# Patient Record
Sex: MALE | Race: ASIAN | Employment: FULL TIME | URBAN - METROPOLITAN AREA
[De-identification: names, ages, dates, MRNs, and addresses within clinical notes are randomized per-mention and may not be internally consistent; named-entity substitution may affect disease eponyms.]

---

## 2017-04-11 ENCOUNTER — APPOINTMENT (OUTPATIENT)
Dept: GENERAL RADIOLOGY | Facility: CLINIC | Age: 47
End: 2017-04-11
Attending: EMERGENCY MEDICINE
Payer: COMMERCIAL

## 2017-04-11 ENCOUNTER — HOSPITAL ENCOUNTER (EMERGENCY)
Facility: CLINIC | Age: 47
Discharge: HOME | End: 2017-04-11
Attending: EMERGENCY MEDICINE | Admitting: EMERGENCY MEDICINE
Payer: COMMERCIAL

## 2017-04-11 VITALS
TEMPERATURE: 98.6 F | HEART RATE: 84 BPM | HEIGHT: 67 IN | OXYGEN SATURATION: 96 % | DIASTOLIC BLOOD PRESSURE: 87 MMHG | WEIGHT: 175 LBS | BODY MASS INDEX: 27.47 KG/M2 | RESPIRATION RATE: 20 BRPM | SYSTOLIC BLOOD PRESSURE: 111 MMHG

## 2017-04-11 DIAGNOSIS — R00.2 PALPITATIONS: ICD-10-CM

## 2017-04-11 DIAGNOSIS — R06.02 SHORTNESS OF BREATH: ICD-10-CM

## 2017-04-11 DIAGNOSIS — R07.9 CHEST PAIN, UNSPECIFIED TYPE: ICD-10-CM

## 2017-04-11 LAB
ANION GAP SERPL CALCULATED.3IONS-SCNC: 10 MMOL/L (ref 3–14)
BASOPHILS # BLD AUTO: 0.1 10E9/L (ref 0–0.2)
BASOPHILS NFR BLD AUTO: 0.5 %
BUN SERPL-MCNC: 12 MG/DL (ref 7–30)
CALCIUM SERPL-MCNC: 8.7 MG/DL (ref 8.5–10.1)
CHLORIDE SERPL-SCNC: 107 MMOL/L (ref 94–109)
CO2 SERPL-SCNC: 24 MMOL/L (ref 20–32)
CREAT SERPL-MCNC: 0.8 MG/DL (ref 0.66–1.25)
D DIMER PPP FEU-MCNC: NORMAL UG/ML FEU (ref 0–0.5)
DIFFERENTIAL METHOD BLD: ABNORMAL
EOSINOPHIL # BLD AUTO: 0.1 10E9/L (ref 0–0.7)
EOSINOPHIL NFR BLD AUTO: 1.3 %
ERYTHROCYTE [DISTWIDTH] IN BLOOD BY AUTOMATED COUNT: 14.6 % (ref 10–15)
GFR SERPL CREATININE-BSD FRML MDRD: NORMAL ML/MIN/1.7M2
GLUCOSE SERPL-MCNC: 96 MG/DL (ref 70–99)
HCT VFR BLD AUTO: 42.5 % (ref 40–53)
HGB BLD-MCNC: 14.4 G/DL (ref 13.3–17.7)
IMM GRANULOCYTES # BLD: 0 10E9/L (ref 0–0.4)
IMM GRANULOCYTES NFR BLD: 0.2 %
INTERPRETATION ECG - MUSE: NORMAL
LYMPHOCYTES # BLD AUTO: 2.7 10E9/L (ref 0.8–5.3)
LYMPHOCYTES NFR BLD AUTO: 24.8 %
MCH RBC QN AUTO: 24.4 PG (ref 26.5–33)
MCHC RBC AUTO-ENTMCNC: 33.9 G/DL (ref 31.5–36.5)
MCV RBC AUTO: 72 FL (ref 78–100)
MONOCYTES # BLD AUTO: 0.9 10E9/L (ref 0–1.3)
MONOCYTES NFR BLD AUTO: 8.6 %
NEUTROPHILS # BLD AUTO: 7 10E9/L (ref 1.6–8.3)
NEUTROPHILS NFR BLD AUTO: 64.6 %
NRBC # BLD AUTO: 0 10*3/UL
NRBC BLD AUTO-RTO: 0 /100
NT-PROBNP SERPL-MCNC: 49 PG/ML (ref 0–450)
PLATELET # BLD AUTO: 277 10E9/L (ref 150–450)
POTASSIUM SERPL-SCNC: 3.9 MMOL/L (ref 3.4–5.3)
RBC # BLD AUTO: 5.89 10E12/L (ref 4.4–5.9)
SODIUM SERPL-SCNC: 141 MMOL/L (ref 133–144)
TROPONIN I SERPL-MCNC: NORMAL UG/L (ref 0–0.04)
WBC # BLD AUTO: 10.8 10E9/L (ref 4–11)

## 2017-04-11 PROCEDURE — 84484 ASSAY OF TROPONIN QUANT: CPT | Performed by: EMERGENCY MEDICINE

## 2017-04-11 PROCEDURE — 85379 FIBRIN DEGRADATION QUANT: CPT | Performed by: EMERGENCY MEDICINE

## 2017-04-11 PROCEDURE — 93005 ELECTROCARDIOGRAM TRACING: CPT

## 2017-04-11 PROCEDURE — 85025 COMPLETE CBC W/AUTO DIFF WBC: CPT | Performed by: EMERGENCY MEDICINE

## 2017-04-11 PROCEDURE — 99285 EMERGENCY DEPT VISIT HI MDM: CPT | Mod: 25

## 2017-04-11 PROCEDURE — 83880 ASSAY OF NATRIURETIC PEPTIDE: CPT | Performed by: EMERGENCY MEDICINE

## 2017-04-11 PROCEDURE — 71020 XR CHEST 2 VW: CPT

## 2017-04-11 PROCEDURE — 80048 BASIC METABOLIC PNL TOTAL CA: CPT | Performed by: EMERGENCY MEDICINE

## 2017-04-11 ASSESSMENT — ENCOUNTER SYMPTOMS
HEADACHES: 0
PALPITATIONS: 0
SHORTNESS OF BREATH: 1
FEVER: 0

## 2017-04-11 NOTE — ED AVS SNAPSHOT
Emergency Department    [address] [facility] [address]    Phone:  [phone]    Fax:  [phone]                                       Aman Broussard   MRN: [id number]    Department:   Emergency Department   Date of Visit:  4/11/2017           Patient Information     Date Of Birth          1970        Your diagnoses for this visit were:     Shortness of breath     Chest pain, unspecified type     Palpitations        You were seen by Zay Graham MD.      Follow-up Information     Follow up with Clinic, [facility] In 2 days.    Contact information:    [address]  [place] [address]  [phone]          Follow up with  Emergency Department.    Specialty:  EMERGENCY MEDICINE    Why:  If symptoms worsen    Contact information:    [address] [facility] [id number] [phone]        Discharge Instructions       Discharge Instructions  Chest Pain    You have been seen today for chest pain or discomfort.  At this time, your doctor has found no signs that your chest pain is due to a serious or life-threatening condition, (or you have declined more testing and/or admission to the hospital). However, sometimes there is a serious problem that does not show up right away. Your evaluation today may not be complete and you may need further testing and evaluation.     You need to follow-up with your regular doctor within 3 days.    Return to the Emergency Department if:    Your chest pain changes, gets worse, starts to happen more often, or comes with less activity.    You are short of breath.    You get very weak or tired.    You pass out or faint.    You have any new symptoms, like fever, cough, numb legs, or you cough up blood.    You have anything else that worries you.    Until you follow-up with your regular doctor please do the following:    Take one aspirin daily unless you have an allergy or are told not to by your doctor.    If a stress  test appointment has been made, go to the appointment.    If you have questions, contact your regular doctor.    If your doctor today has told you to follow-up with your regular doctor, it is very important that you make an appointment with your clinic and go to the appointment.  If you do not follow-up with your primary doctor, it may result in missing an important development which could result in permanent injury or disability and/or lasting pain.  If there is any problem keeping your appointment, call your doctor or return to the Emergency Department.    If you were given a prescription for medicine here today, be sure to read all of the information (including the package insert) that comes with your prescription.  This will include important information about the medicine, its side effects, and any warnings that you need to know about.  The pharmacist who fills the prescription can provide more information and answer questions you may have about the medicine.  If you have questions or concerns that the pharmacist cannot address, please call or return to the Emergency Department.     Opioid Medication Information    Pain medications are among the most commonly prescribed medicines, so we are including this information for all our patients. If you did not receive pain medication or get a prescription for pain medicine, you can ignore it.     You may have been given a prescription for an opioid (narcotic) pain medicine and/or have received a pain medicine while here in the Emergency Department. These medicines can make you drowsy or impaired. You must not drive, operate dangerous equipment, or engage in any other dangerous activities while taking these medications. If you drive while taking these medications, you could be arrested for DUI, or driving under the influence. Do not drink any alcohol while you are taking these medications.     Opioid pain medications can cause addiction. If you have a history of  chemical dependency of any type, you are at a higher risk of becoming addicted to pain medications.  Only take these prescribed medications to treat your pain when all other options have been tried. Take it for as short a time and as few doses as possible. Store your pain pills in a secure place, as they are frequently stolen and provide a dangerous opportunity for children or visitors in your house to start abusing these powerful medications. We will not replace any lost or stolen medicine.  As soon as your pain is better, you should flush all your remaining medication.     Many prescription pain medications contain Tylenol  (acetaminophen), including Vicodin , Tylenol #3 , Norco , Lortab , and Percocet .  You should not take any extra pills of Tylenol  if you are using these prescription medications or you can get very sick.  Do not ever take more than 3000 mg of acetaminophen in any 24 hour period.    All opioids tend to cause constipation. Drink plenty of water and eat foods that have a lot of fiber, such as fruits, vegetables, prune juice, apple juice and high fiber cereal.  Take a laxative if you don t move your bowels at least every other day. Miralax , Milk of Magnesia, Colace , or Senna  can be used to keep you regular.      Remember that you can always come back to the Emergency Department if you are not able to see your regular doctor in the amount of time listed above, if you get any new symptoms, or if there is anything that worries you.          Discharge References/Attachments     PALPITATIONS (ENGLISH)    SHORTNESS OF BREATH (DYSPNEA) (ENGLISH)      24 Hour Appointment Hotline       To make an appointment at any [facility], call [phone] ([phone]). If you don't have a family doctor or clinic, we will help you find one. [facility] are conveniently located to serve the needs of you and your family.             Review of your medicines      Our records show that you are taking  the medicines listed below. If these are incorrect, please call your family doctor or clinic.        Dose / Directions Last dose taken    cetirizine 10 MG tablet   Commonly known as:  zyrTEC   Dose:  10 mg   Quantity:  30 tablet        Take 1 tablet (10 mg) by mouth every evening   Refills:  1        hydrocortisone 0.2 % cream   Commonly known as:  WESTCORT   Quantity:  15 g        Apply sparingly to affected area three times daily for 14 days.   Refills:  0        ketoconazole 2 % cream   Commonly known as:  NIZORAL   Quantity:  15 g        Apply topically 2 times daily   Refills:  1        METOPROLOL TARTRATE PO        Refills:  0        * psyllium 100 % Powd   Quantity:  100 g        1 tablespoon daily to bid as needed to keep stools soft.  Please give whatever standard tub is (100 grams was a guess)   Refills:  prn        * WAL-MUCIL 58.6 % Powd   Quantity:  662 g   Generic drug:  psyllium        TAKE 1 TABLESPOON DAILY TO TWICE DAILY AS NEEDED TO KEEP STOOLS SOFT   Refills:  11        * Notice:  This list has 2 medication(s) that are the same as other medications prescribed for you. Read the directions carefully, and ask your doctor or other care provider to review them with you.            Procedures and tests performed during your visit     Basic metabolic panel    CBC with platelets + differential    D dimer quantitative    EKG 12-lead, tracing only    Nt probnp inpatient    Troponin I (now)    XR Chest 2 Views      Orders Needing Specimen Collection     None      Pending Results     No orders found from 4/9/2017 to 4/12/2017.            Pending Culture Results     No orders found from 4/9/2017 to 4/12/2017.            Test Results From Your Hospital Stay        4/11/2017  2:14 PM      Component Results     Component Value Ref Range & Units Status    WBC 10.8 4.0 - 11.0 10e9/L Final    RBC Count 5.89 4.4 - 5.9 10e12/L Final    Hemoglobin 14.4 13.3 - 17.7 g/dL Final    Hematocrit 42.5 40.0 - 53.0 % Final     MCV 72 (L) 78 - 100 fl Final    MCH 24.4 (L) 26.5 - 33.0 pg Final    MCHC 33.9 31.5 - 36.5 g/dL Final    RDW 14.6 10.0 - 15.0 % Final    Platelet Count 277 150 - 450 10e9/L Final    Diff Method Automated Method  Final    % Neutrophils 64.6 % Final    % Lymphocytes 24.8 % Final    % Monocytes 8.6 % Final    % Eosinophils 1.3 % Final    % Basophils 0.5 % Final    % Immature Granulocytes 0.2 % Final    Nucleated RBCs 0 0 /100 Final    Absolute Neutrophil 7.0 1.6 - 8.3 10e9/L Final    Absolute Lymphocytes 2.7 0.8 - 5.3 10e9/L Final    Absolute Monocytes 0.9 0.0 - 1.3 10e9/L Final    Absolute Eosinophils 0.1 0.0 - 0.7 10e9/L Final    Absolute Basophils 0.1 0.0 - 0.2 10e9/L Final    Abs Immature Granulocytes 0.0 0 - 0.4 10e9/L Final    Absolute Nucleated RBC 0.0  Final         4/11/2017  2:31 PM      Component Results     Component Value Ref Range & Units Status    Sodium 141 133 - 144 mmol/L Final    Potassium 3.9 3.4 - 5.3 mmol/L Final    Chloride 107 94 - 109 mmol/L Final    Carbon Dioxide 24 20 - 32 mmol/L Final    Anion Gap 10 3 - 14 mmol/L Final    Glucose 96 70 - 99 mg/dL Final    Urea Nitrogen 12 7 - 30 mg/dL Final    Creatinine 0.80 0.66 - 1.25 mg/dL Final    GFR Estimate >90  Non  GFR Calc   >60 mL/min/1.7m2 Final    GFR Estimate If Black >90   GFR Calc   >60 mL/min/1.7m2 Final    Calcium 8.7 8.5 - 10.1 mg/dL Final         4/11/2017  2:37 PM      Component Results     Component Value Ref Range & Units Status    Troponin I ES  0.000 - 0.045 ug/L Final    <0.015  The 99th percentile for upper reference range is 0.045 ug/L.  Troponin values in   the range of 0.045 - 0.120 ug/L may be associated with risks of adverse   clinical events.           4/11/2017  2:46 PM      Narrative     CHEST TWO VIEWS April 11, 2017 2:30 PM     HISTORY: Short of breath.    COMPARISON: 01/04/2013.    FINDINGS: New electronic pack over the anterior left chest wall not  present on 1/4/2013. Heart and lungs  negative.        Impression     IMPRESSION: Negative.    CAMILA NINO MD         4/11/2017  2:41 PM      Component Results     Component Value Ref Range & Units Status    D Dimer  0.0 - 0.50 ug/ml FEU Final    <0.3  This D-dimer assay is intended for use in conjuntion with a clinical pretest   probability assessment model to exclude pulmonary embolism (PE) and as an aid   in the diagnosis of deep venous thrombosis (DVT) in outpatients suspected of PE   or DVT. The cut-off value is 0.5 g/mL FEU.           4/11/2017  2:37 PM      Component Results     Component Value Ref Range & Units Status    N-Terminal Pro BNP Inpatient 49 0 - 450 pg/mL Final    Reference range shown and results flagged as abnormal are suggested inpatient   cut points for confirming diagnosis if CHF in an acute setting. Establishing   a   baseline value for each individual patient is useful for follow-up. An   inpatient or emergency department NT-proPBNP <300 pg/mL effectively rules out   acute CHF, with 99% negative predictive value.  The outpatient non-acute reference range for ruling out CHF is:   0-125 pg/mL (age 18 to less than 75)   0-450 pg/mL (age 75 yrs and older)                  Clinical Quality Measure: Blood Pressure Screening     Your blood pressure was checked while you were in the emergency department today. The last reading we obtained was  BP: 111/87 . Please read the guidelines below about what these numbers mean and what you should do about them.  If your systolic blood pressure (the top number) is less than 120 and your diastolic blood pressure (the bottom number) is less than 80, then your blood pressure is normal. There is nothing more that you need to do about it.  If your systolic blood pressure (the top number) is 120-139 or your diastolic blood pressure (the bottom number) is 80-89, your blood pressure may be higher than it should be. You should have your blood pressure rechecked within a year by a primary care  "provider.  If your systolic blood pressure (the top number) is 140 or greater or your diastolic blood pressure (the bottom number) is 90 or greater, you may have high blood pressure. High blood pressure is treatable, but if left untreated over time it can put you at risk for heart attack, stroke, or kidney failure. You should have your blood pressure rechecked by a primary care provider within the next 4 weeks.  If your provider in the emergency department today gave you specific instructions to follow-up with your doctor or provider even sooner than that, you should follow that instruction and not wait for up to 4 weeks for your follow-up visit.        Thank you for choosing [facility]       Thank you for choosing [facility] for your care. Our goal is always to provide you with excellent care. Hearing back from our patients is one way we can continue to improve our services. Please take a few minutes to complete the written survey that you may receive in the mail after you visit with us. Thank you!        [facility] Information     [facility] lets you send messages to your doctor, view your test results, renew your prescriptions, schedule appointments and more. To sign up, go to [email or web address] . Click on \"Log in\" on the left side of the screen, which will take you to the Welcome page. Then click on \"Sign up Now\" on the right side of the page.     You will be asked to enter the access code listed below, as well as some personal information. Please follow the directions to create your username and password.     Your access code is: [email or web address]  Expires: 7/10/2017  3:36 PM     Your access code will  in 90 days. If you need help or a new code, please call your [facility] clinic or [phone].        Care EveryWhere ID     This is your Care EveryWhere ID. This could be used by other organizations to access your [facility] medical records  [id number]        After Visit Summary       This is your record. Keep this " with you and show to your community pharmacist(s) and doctor(s) at your next visit.

## 2017-04-11 NOTE — ED PROVIDER NOTES
"  History     Chief Complaint:  Shortness of Breath    HPI   Aman Broussard is a 46 year old male who presents to the emergency department today for evaluation of shortness of breath. The patient reports that for the past 3 weeks he has been experiencing shortness of breath and \"chest heaviness\" for the past week. He was seen at [place] one week ago, was started on Metoprolol, and had a cardiac monitor placed because he feels that his heart is racing and was measured at 120 BPM there. He states that since then he has been experiencing more and more shortness of breath of breath and is concerned for this reason. He notes that his chest pain is actually worse while he is sitting and better with exertion. Furthermore, the patient notes that he is concerned as his 49-year-old brother had an MI recently. He denies any fevers, headaches, palpitations, cough, or other symptoms at this time. Moreover, he notes that he does smoke cigarettes daily, and also had a stress echo in November 2016 as per below.     Cardiac Risk Factors:  CAD:                 -  Hypertension :    -  Hyperlipidemia:  +  Diabetes:       -  Tobacco use:     +    Gender:      Male  Age:        46  Familial Hx of CAD:  +    PE/DVT Risk Factors:   Hx of PE/DVT:   -  Hx of clotting disorder:  -  Hx of cancer:    -  Tobacco use:    +  Hormone therapy:   -  Prolonged immobilization:  -  Recent surgery:   -  Recent travel:    -  Familial Hx of PE/DVT:  -    [place], Stress Echocardiogram, 11/25/2016  Final Impressions:   1. Post stress, normal left ventricular size, normal global systolic function with an estimated EF of >75%.   2. Maximum stress test with 95.6% of age predicted maximum heart rate achieved.   3. Negative stress echo for ischemia.   4. There were no ischemic changes by EKG during stress.   5. During stress exam the patient developed fatigue.    Allergies:  No Known Drug Allergies    Medications:  " "  Metoprolol  Westcort  Zyrtec  Nizoral    Past Medical History:    Hyperlipidemia    Past Surgical History:    History reviewed. No pertinent past surgical history.    Family History:    Father - Hypertension  Brother - MI    Social History:  The patient was accompanied to the ED by his wife.  Smoking Status: Current Every Day Smoker  Alcohol Use: Positive  Marital Status:   [2]     Review of Systems   Constitutional: Negative for fever.   Respiratory: Positive for shortness of breath.    Cardiovascular: Positive for chest pain. Negative for palpitations.   Neurological: Negative for headaches.   All other systems reviewed and are negative.    Physical Exam   Vitals:  Patient Vitals for the past 24 hrs:   BP Temp Temp src Pulse Heart Rate Resp SpO2 Height Weight   04/11/17 1531 111/87 - - - 75 20 96 % - -   04/11/17 1500 98/73 - - - 86 14 - - -   04/11/17 1346 139/86 98.6  F (37  C) Oral 84 - 16 99 % 1.702 m (5' 7\") 79.4 kg (175 lb)     Physical Exam  Nursing note and vitals reviewed.  Constitutional:  Oriented to person, place, and time. Cooperative.   HENT:   Nose:    Nose normal.   Mouth/Throat:   Mucous membranes are normal.   Eyes:    Conjunctivae normal and EOM are normal.      Pupils are equal, round, and reactive to light.   Neck:    Trachea normal.   Cardiovascular:  Normal rate, regular rhythm, normal heart sounds and normal pulses. No murmur heard.  Pulmonary/Chest:  Effort normal and breath sounds normal.   Abdominal:   Soft. Normal appearance and bowel sounds are normal.      There is no tenderness.      There is no rebound and no CVA tenderness.   Musculoskeletal:  Extremities atraumatic x 4.   Lymphadenopathy:  No cervical adenopathy.   Neurological:   Alert and oriented to person, place, and time. Normal strength.      No cranial nerve deficit or sensory deficit. GCS eye subscore is 4. GCS verbal subscore is 5. GCS motor subscore is 6.   Skin:    Skin is intact. No rash noted.   Psychiatric: "   Normal mood and affect.    Emergency Department Course     ECG:  ECG taken at 1343, ECG read at 1405  Normal sinus rhythm  Left anterior fascicular block  Abnormal ECG  Rate 85 bpm. CA interval 184. QRS duration 98. QT/QTc 366/435. P-R-T axes 50 -48 54.    Imaging:  Radiology findings were communicated with the patient who voiced understanding of the findings.    Chest x-ray:  Negative  Reading per radiology    Laboratory:  Laboratory findings were communicated with the patient who voiced understanding of the findings.    CBC: WNL. (WBC 10.8, HGB 14.4, )   BMP: Creatinine: 0.80  Troponin (Collected 1400): <0.015  D Dimer (Collected 1400): <0.3  BNP: 49    Emergency Department Course:  Nursing notes and vitals reviewed.  I performed an exam of the patient as documented above.   The patient was sent for a chest x-ray while in the emergency department, results above.   IV was inserted and blood was drawn for laboratory testing, results above.  At 1449 the patient was rechecked and was updated on the results of his laboratory and imaging studies.   I discussed the treatment plan with the patient. He expressed understanding of this plan and consented to discharge. He will be discharged home with instructions for care and follow up. In addition, the patient will return to the emergency department if their symptoms persist, worsen, if new symptoms arise or if there is any concern.  All questions were answered.  I personally reviewed the laboratory and imaging results with the patient and answered all related questions prior to discharge.    Impression & Plan      Medical Decision Making:  Aman Broussard is a 46 year old male who presents to the emergency department today for evaluation of shortness of breath, palpitations, and chest discomfort. Unfortunately, he was not the best historian and in fact it was quite difficult to follow what he was trying to explain to me. However, in reviewing his Care Everywhere chart,  he has been seen quite a few times in the past year for similar complaints. He currently has a Holter monitor on him due to his complaint of tachycardia and palpitations. He had a stress test obtained last fall which was unremarkable. I proceeded with the above work up here, including the EKG, chest x-ray, and blood work as well. His work up here is unremarkable. He does not appear to have a pulmonary embolism with a negative d-dimer and he does not appear to have signs of pulmonary edema or cardiac ischemia. I suspect that there is a significant anxiety component as well. I recommended close outpatient follow up with his primary MD as well as possibly discussing things further with a cardiologist. He very well might be a good candidate for a CT coronary angiogram due to his age and concerns, as well as the fact that he does have some family history. At this point though I feel comfortable discharging him home.     Diagnosis:    ICD-10-CM    1. Shortness of breath R06.02    2. Chest pain, unspecified type R07.9    3. Palpitations R00.2        Scribe Disclosure:  I, Zackery Espinoza, am serving as a scribe at 2:17 PM on 4/11/2017 to document services personally performed by Zay Graham MD, based on my observations and the provider's statements to me.    4/11/2017    EMERGENCY DEPARTMENT       Zay Graham MD  04/11/17 2108

## 2017-04-11 NOTE — ED AVS SNAPSHOT
Emergency Department    [address] [facility] [address]    Phone:  [phone]    Fax:  [phone]                                       Aman Broussard   MRN: [id number]    Department:   Emergency Department   Date of Visit:  4/11/2017           After Visit Summary Signature Page     I have received my discharge instructions, and my questions have been answered. I have discussed any challenges I see with this plan with the nurse or doctor.    ..........................................................................................................................................  Patient/Patient Representative Signature      ..........................................................................................................................................  Patient Representative Print Name and Relationship to Patient    ..................................................               ................................................  Date                                            Time    ..........................................................................................................................................  Reviewed by Signature/Title    ...................................................              ..............................................  Date                                                            Time

## 2017-11-02 ENCOUNTER — OFFICE VISIT (OUTPATIENT)
Dept: URGENT CARE | Facility: URGENT CARE | Age: 47
End: 2017-11-02
Payer: COMMERCIAL

## 2017-11-02 VITALS
TEMPERATURE: 97.2 F | HEART RATE: 82 BPM | BODY MASS INDEX: 27.96 KG/M2 | OXYGEN SATURATION: 96 % | DIASTOLIC BLOOD PRESSURE: 87 MMHG | RESPIRATION RATE: 16 BRPM | WEIGHT: 178.5 LBS | SYSTOLIC BLOOD PRESSURE: 132 MMHG

## 2017-11-02 DIAGNOSIS — R51.9 NONINTRACTABLE EPISODIC HEADACHE, UNSPECIFIED HEADACHE TYPE: ICD-10-CM

## 2017-11-02 DIAGNOSIS — R60.9 EDEMA, UNSPECIFIED TYPE: Primary | ICD-10-CM

## 2017-11-02 LAB
ALBUMIN SERPL-MCNC: 3.8 G/DL (ref 3.4–5)
ALBUMIN UR-MCNC: NEGATIVE MG/DL
ALP SERPL-CCNC: 88 U/L (ref 40–150)
ALT SERPL W P-5'-P-CCNC: 33 U/L (ref 0–70)
ANION GAP SERPL CALCULATED.3IONS-SCNC: 5 MMOL/L (ref 3–14)
APPEARANCE UR: CLEAR
AST SERPL W P-5'-P-CCNC: 19 U/L (ref 0–45)
BILIRUB DIRECT SERPL-MCNC: <0.1 MG/DL (ref 0–0.2)
BILIRUB SERPL-MCNC: 0.2 MG/DL (ref 0.2–1.3)
BILIRUB UR QL STRIP: NEGATIVE
BUN SERPL-MCNC: 19 MG/DL (ref 7–30)
CALCIUM SERPL-MCNC: 8.9 MG/DL (ref 8.5–10.1)
CHLORIDE SERPL-SCNC: 109 MMOL/L (ref 94–109)
CO2 SERPL-SCNC: 28 MMOL/L (ref 20–32)
COLOR UR AUTO: YELLOW
CREAT SERPL-MCNC: 0.98 MG/DL (ref 0.66–1.25)
ERYTHROCYTE [DISTWIDTH] IN BLOOD BY AUTOMATED COUNT: 16.1 % (ref 10–15)
GFR SERPL CREATININE-BSD FRML MDRD: 81 ML/MIN/1.7M2
GLUCOSE SERPL-MCNC: 94 MG/DL (ref 70–99)
GLUCOSE UR STRIP-MCNC: NEGATIVE MG/DL
HCT VFR BLD AUTO: 40.8 % (ref 40–53)
HGB BLD-MCNC: 13.3 G/DL (ref 13.3–17.7)
HGB UR QL STRIP: NEGATIVE
KETONES UR STRIP-MCNC: NEGATIVE MG/DL
LEUKOCYTE ESTERASE UR QL STRIP: NEGATIVE
MCH RBC QN AUTO: 23.7 PG (ref 26.5–33)
MCHC RBC AUTO-ENTMCNC: 32.6 G/DL (ref 31.5–36.5)
MCV RBC AUTO: 73 FL (ref 78–100)
NITRATE UR QL: NEGATIVE
PH UR STRIP: 6.5 PH (ref 5–7)
PLATELET # BLD AUTO: 302 10E9/L (ref 150–450)
POTASSIUM SERPL-SCNC: 4.1 MMOL/L (ref 3.4–5.3)
PROT SERPL-MCNC: 7 G/DL (ref 6.8–8.8)
RBC # BLD AUTO: 5.62 10E12/L (ref 4.4–5.9)
RBC #/AREA URNS AUTO: NORMAL /HPF
SODIUM SERPL-SCNC: 142 MMOL/L (ref 133–144)
SOURCE: NORMAL
SP GR UR STRIP: 1.01 (ref 1–1.03)
UROBILINOGEN UR STRIP-ACNC: 0.2 EU/DL (ref 0.2–1)
WBC # BLD AUTO: 10.8 10E9/L (ref 4–11)
WBC #/AREA URNS AUTO: NORMAL /HPF

## 2017-11-02 PROCEDURE — 82248 BILIRUBIN DIRECT: CPT | Performed by: FAMILY MEDICINE

## 2017-11-02 PROCEDURE — 99213 OFFICE O/P EST LOW 20 MIN: CPT | Performed by: FAMILY MEDICINE

## 2017-11-02 PROCEDURE — 81001 URINALYSIS AUTO W/SCOPE: CPT | Performed by: FAMILY MEDICINE

## 2017-11-02 PROCEDURE — 36415 COLL VENOUS BLD VENIPUNCTURE: CPT | Performed by: FAMILY MEDICINE

## 2017-11-02 PROCEDURE — 80053 COMPREHEN METABOLIC PANEL: CPT | Performed by: FAMILY MEDICINE

## 2017-11-02 PROCEDURE — 85027 COMPLETE CBC AUTOMATED: CPT | Performed by: FAMILY MEDICINE

## 2017-11-02 NOTE — MR AVS SNAPSHOT
"              After Visit Summary   2017    Aman Broussard    MRN: [id number]           Patient Information     Date Of Birth          1970        Visit Information        Provider Department      2017 7:30 PM Magnolia Thomas MD [facility]        Today's Diagnoses     Edema, unspecified type    -  1    Nonintractable episodic headache, unspecified headache type           Follow-ups after your visit        Who to contact     If you have questions or need follow up information about today's clinic visit or your schedule please contact [facility] directly at [phone].  Normal or non-critical lab and imaging results will be communicated to you by Sprout Socialhart, letter or phone within 4 business days after the clinic has received the results. If you do not hear from us within 7 days, please contact the clinic through Sprout Socialhart or phone. If you have a critical or abnormal lab result, we will notify you by phone as soon as possible.  Submit refill requests through app2you or call your pharmacy and they will forward the refill request to us. Please allow 3 business days for your refill to be completed.          Additional Information About Your Visit        MyChart Information     app2you lets you send messages to your doctor, view your test results, renew your prescriptions, schedule appointments and more. To sign up, go to [email or web address] . Click on \"Log in\" on the left side of the screen, which will take you to the Welcome page. Then click on \"Sign up Now\" on the right side of the page.     You will be asked to enter the access code listed below, as well as some personal information. Please follow the directions to create your username and password.     Your access code is: [email or web address]  Expires: 2018  9:06 PM     Your access code will  in 90 days. If you need help or a new code, please call your [facility] clinic or [phone].      "   Care EveryWhere ID     This is your Care EveryWhere ID. This could be used by other organizations to access your [facility] medical records  [id number]        Your Vitals Were     Pulse Temperature Respirations Pulse Oximetry BMI (Body Mass Index)       82 97.2  F (36.2  C) (Oral) 16 96% 27.96 kg/m2        Blood Pressure from Last 3 Encounters:   11/02/17 132/87   04/11/17 111/87   06/30/15 (!) 137/91    Weight from Last 3 Encounters:   11/02/17 178 lb 8 oz (81 kg)   04/11/17 175 lb (79.4 kg)   06/30/15 180 lb 9.6 oz (81.9 kg)              We Performed the Following     Bilirubin direct     CBC with platelets     Comprehensive metabolic panel (BMP + Alb, Alk Phos, ALT, AST, Total. Bili, TP)     UA with Microscopic        Primary Care Provider Office Phone # Fax #    [facility] [phone] [address]  [facility] [place] [address]        Equal Access to Services     [facility]: Hadii aad ku hadasho Soomaali, waaxda luqadaha, qaybta kaalmada adeegyada, waxcharlie huertasin hayaven az cronin . So [facility] [phone].    ATENCIÓN: Si habla español, tiene a hutton disposición servicios gratuitos de asistencia lingüística. Llame al [phone].    We comply with applicable federal civil rights laws and [place] laws. We do not discriminate on the basis of race, color, national origin, age, disability, sex, sexual orientation, or gender identity.            Thank you!     Thank you for choosing [facility]  for your care. Our goal is always to provide you with excellent care. Hearing back from our patients is one way we can continue to improve our services. Please take a few minutes to complete the written survey that you may receive in the mail after your visit with us. Thank you!             Your Updated Medication List - Protect others around you: Learn how to safely use, store and throw away your medicines at www.disposemymeds.org.          This  list is accurate as of: 11/2/17  9:06 PM.  Always use your most recent med list.                   Brand Name Dispense Instructions for use Diagnosis    cetirizine 10 MG tablet    zyrTEC    30 tablet    Take 1 tablet (10 mg) by mouth every evening    Hives       hydrocortisone 0.2 % cream    WESTCORT    15 g    Apply sparingly to affected area three times daily for 14 days.    Dermatitis       ketoconazole 2 % cream    NIZORAL    15 g    Apply topically 2 times daily    Dermatitis       METOPROLOL TARTRATE PO           * psyllium 100 % Powd     100 g    1 tablespoon daily to bid as needed to keep stools soft.  Please give whatever standard tub is (100 grams was a guess)    Constipation       * WAL-MUCIL 58.6 % Powd   Generic drug:  psyllium     662 g    TAKE 1 TABLESPOON DAILY TO TWICE DAILY AS NEEDED TO KEEP STOOLS SOFT    IBS (irritable bowel syndrome)       * Notice:  This list has 2 medication(s) that are the same as other medications prescribed for you. Read the directions carefully, and ask your doctor or other care provider to review them with you.

## 2017-11-03 NOTE — PROGRESS NOTES
SUBJECTIVE:  Aman Broussard, a 47 year old male scheduled an appointment to discuss the following issues:     Edema, unspecified type  Nonintractable episodic headache, unspecified headache type     He is here with edema of the lower leg He has no chest symptoms and has been noticing lower leg swelling for last 2 weeks He has no urinary symptoms   Has some constipation which is not new for him , denies any fever or chills , has been taking his meds for HTN   He has no SOB or Dyspnea , he also complains of headache which is in the top of the head and has it for many months   Did have normal MRI Was seen by neurologist and started on medications and has not been taking it as is worried about side effects     Past Medical History:   Diagnosis Date     High cholesterol       No past surgical history on file.     Social History     Social History     Marital status:      Spouse name: N/A     Number of children: N/A     Years of education: N/A     Occupational History     Not on file.     Social History Main Topics     Smoking status: Current Every Day Smoker     Packs/day: 1.00     Years: 25.00     Types: Cigarettes     Smokeless tobacco: Never Used      Comment: started at age 19     Alcohol use 1.5 oz/week     3 Standard drinks or equivalent per week     Drug use: No     Sexual activity: Yes     Partners: Female     Other Topics Concern     Parent/Sibling W/ Cabg, Mi Or Angioplasty Before 65f 55m? No     Social History Narrative        Current Outpatient Prescriptions   Medication Sig Dispense Refill     METOPROLOL TARTRATE PO        WAL-MUCIL 58.6 % POWD TAKE 1 TABLESPOON DAILY TO TWICE DAILY AS NEEDED TO KEEP STOOLS SOFT (Patient not taking: Reported on 11/2/2017) 662 g 11     psyllium 100 % POWD 1 tablespoon daily to bid as needed to keep stools soft.  Please give whatever standard tub is (100 grams was a guess) (Patient not taking: Reported on 11/2/2017) 100 g prn     ketoconazole (NIZORAL) 2 % cream Apply  topically 2 times daily (Patient not taking: Reported on 11/2/2017) 15 g 1     hydrocortisone (WESTCORT) 0.2 % cream Apply sparingly to affected area three times daily for 14 days. (Patient not taking: Reported on 11/2/2017) 15 g 0     cetirizine (ZYRTEC) 10 MG tablet Take 1 tablet (10 mg) by mouth every evening (Patient not taking: Reported on 11/2/2017) 30 tablet 1       Health Maintenance   Topic Date Due     LIPID SCREEN Q5 YR MALE (SYSTEM ASSIGNED)  07/02/2003     TETANUS IMMUNIZATION (SYSTEM ASSIGNED)  12/11/2014     INFLUENZA VACCINE (SYSTEM ASSIGNED)  09/01/2017        ROS:  CONSTITUTIONAL:no fever, no chills or sweats, no excessive fatigue, no significant change in weight  CV: neg   RESP -neg  GI:  Neg   NEURO: neg   MSK - neg   Skin - neg   Pyschiatry-neg     OBJECTIVE:  /87 (BP Location: Left arm, Patient Position: Chair, Cuff Size: Adult Regular)  Pulse 82  Temp 97.2  F (36.2  C) (Oral)  Resp 16  Wt 178 lb 8 oz (81 kg)  SpO2 96%  BMI 27.96 kg/m2      EXAM:  GENERAL APPEARANCE: healthy, alert and no distress  EYES: EOMI,  PERRL  HENT: ear canals and TM's normal and nose and mouth without ulcers or lesions  RESP: lungs clear to auscultation - no rales, rhonchi or wheezes  CV: regular rates and rhythm, normal S1 S2, no S3 or S4 and no murmur, click or rub -  ABDOMEN:  soft, nontender, no HSM or masses and bowel sounds normal  NEURO: Normal strength and tone, sensory exam grossly normal, mentation intact and speech normal  Ext- 1 + edema in the lower legs   PSYCH: mentation appears normal and affect normal/bright        Results for orders placed or performed in visit on 11/02/17   CBC with platelets   Result Value Ref Range    WBC 10.8 4.0 - 11.0 10e9/L    RBC Count 5.62 4.4 - 5.9 10e12/L    Hemoglobin 13.3 13.3 - 17.7 g/dL    Hematocrit 40.8 40.0 - 53.0 %    MCV 73 (L) 78 - 100 fl    MCH 23.7 (L) 26.5 - 33.0 pg    MCHC 32.6 31.5 - 36.5 g/dL    RDW 16.1 (H) 10.0 - 15.0 %    Platelet Count 302 150  - 450 10e9/L   UA with Microscopic   Result Value Ref Range    Color Urine Yellow     Appearance Urine Clear     Glucose Urine Negative NEG^Negative mg/dL    Bilirubin Urine Negative NEG^Negative    Ketones Urine Negative NEG^Negative mg/dL    Specific Gravity Urine 1.015 1.003 - 1.035    pH Urine 6.5 5.0 - 7.0 pH    Protein Albumin Urine Negative NEG^Negative mg/dL    Urobilinogen Urine 0.2 0.2 - 1.0 EU/dL    Nitrite Urine Negative NEG^Negative    Blood Urine Negative NEG^Negative    Leukocyte Esterase Urine Negative NEG^Negative    Source Midstream Urine     WBC Urine O - 2 OTO2^O - 2 /HPF    RBC Urine O - 2 OTO2^O - 2 /HPF   Comprehensive metabolic panel (BMP + Alb, Alk Phos, ALT, AST, Total. Bili, TP)   Result Value Ref Range    Sodium 142 133 - 144 mmol/L    Potassium 4.1 3.4 - 5.3 mmol/L    Chloride 109 94 - 109 mmol/L    Carbon Dioxide 28 20 - 32 mmol/L    Anion Gap 5 3 - 14 mmol/L    Glucose 94 70 - 99 mg/dL    Urea Nitrogen 19 7 - 30 mg/dL    Creatinine 0.98 0.66 - 1.25 mg/dL    GFR Estimate 81 >60 mL/min/1.7m2    GFR Estimate If Black >90 >60 mL/min/1.7m2    Calcium 8.9 8.5 - 10.1 mg/dL    Bilirubin Total 0.2 0.2 - 1.3 mg/dL    Albumin 3.8 3.4 - 5.0 g/dL    Protein Total 7.0 6.8 - 8.8 g/dL    Alkaline Phosphatase 88 40 - 150 U/L    ALT 33 0 - 70 U/L    AST 19 0 - 45 U/L   Bilirubin direct   Result Value Ref Range    Bilirubin Direct <0.1 0.0 - 0.2 mg/dL         ASSESSMENT/PLAN:  Aman was seen today for urgent care.    Diagnoses and all orders for this visit:    Edema, unspecified type  -     Cancel: Basic metabolic panel  -     CBC with platelets  -     UA with Microscopic  -     Cancel: Hepatic panel  -     Comprehensive metabolic panel (BMP + Alb, Alk Phos, ALT, AST, Total. Bili, TP)  -     Bilirubin direct    Nonintractable episodic headache, unspecified headache type        Discussed with him to do meds prescribed by nerurology  Advised to do amitryptyline for insomnia   Follow up if  symptoms fail to  improve or worsens   Pt understood and agreed with plan       Spent>25 minutes with patient and > 50% of the time was for counselling       Magnolia Thomas MD

## 2020-10-23 NOTE — NURSING NOTE
"Chief Complaint   Patient presents with     Urgent Care     Pt states body aches on bilateral legs swelling 2x weeks        Initial /87 (BP Location: Left arm, Patient Position: Chair, Cuff Size: Adult Regular)  Pulse 82  Temp 97.2  F (36.2  C) (Oral)  Resp 16  Wt 178 lb 8 oz (81 kg)  SpO2 96%  BMI 27.96 kg/m2 Estimated body mass index is 27.96 kg/(m^2) as calculated from the following:    Height as of 4/11/17: 5' 7\" (1.702 m).    Weight as of this encounter: 178 lb 8 oz (81 kg).  Medication Reconciliation: complete      "
Never smoker

## 2024-12-09 ENCOUNTER — HOSPITAL ENCOUNTER (EMERGENCY)
Facility: CLINIC | Age: 54
Discharge: HOME | End: 2024-12-09
Attending: STUDENT IN AN ORGANIZED HEALTH CARE EDUCATION/TRAINING PROGRAM | Admitting: STUDENT IN AN ORGANIZED HEALTH CARE EDUCATION/TRAINING PROGRAM
Payer: COMMERCIAL

## 2024-12-09 ENCOUNTER — APPOINTMENT (OUTPATIENT)
Dept: GENERAL RADIOLOGY | Facility: CLINIC | Age: 54
End: 2024-12-09
Attending: STUDENT IN AN ORGANIZED HEALTH CARE EDUCATION/TRAINING PROGRAM
Payer: COMMERCIAL

## 2024-12-09 VITALS
DIASTOLIC BLOOD PRESSURE: 77 MMHG | RESPIRATION RATE: 16 BRPM | SYSTOLIC BLOOD PRESSURE: 114 MMHG | OXYGEN SATURATION: 100 % | HEART RATE: 102 BPM | TEMPERATURE: 98.8 F

## 2024-12-09 DIAGNOSIS — S92.309A CLOSED FRACTURE OF MULTIPLE METATARSAL BONES: ICD-10-CM

## 2024-12-09 PROCEDURE — 73630 X-RAY EXAM OF FOOT: CPT | Mod: LT

## 2024-12-09 PROCEDURE — 29515 APPLICATION SHORT LEG SPLINT: CPT | Mod: LT

## 2024-12-09 PROCEDURE — 99284 EMERGENCY DEPT VISIT MOD MDM: CPT | Mod: 25

## 2024-12-09 PROCEDURE — 250N000013 HC RX MED GY IP 250 OP 250 PS 637: Performed by: EMERGENCY MEDICINE

## 2024-12-09 RX ORDER — IBUPROFEN 600 MG/1
600 TABLET, FILM COATED ORAL ONCE
Status: COMPLETED | OUTPATIENT
Start: 2024-12-09 | End: 2024-12-09

## 2024-12-09 RX ADMIN — IBUPROFEN 600 MG: 600 TABLET ORAL at 11:47

## 2024-12-09 ASSESSMENT — ACTIVITIES OF DAILY LIVING (ADL)
ADLS_ACUITY_SCORE: 41

## 2024-12-09 ASSESSMENT — COLUMBIA-SUICIDE SEVERITY RATING SCALE - C-SSRS
6. HAVE YOU EVER DONE ANYTHING, STARTED TO DO ANYTHING, OR PREPARED TO DO ANYTHING TO END YOUR LIFE?: NO
2. HAVE YOU ACTUALLY HAD ANY THOUGHTS OF KILLING YOURSELF IN THE PAST MONTH?: NO
1. IN THE PAST MONTH, HAVE YOU WISHED YOU WERE DEAD OR WISHED YOU COULD GO TO SLEEP AND NOT WAKE UP?: NO

## 2024-12-09 NOTE — ED TRIAGE NOTES
Pt left foot swollen, bruised and painful. Last took tylenol this am and norco last noc. Pt seen at [facility] yesterday. Toes pink and able to move. Splint in place.      Triage Assessment (Adult)       Row Name 12/09/24 1143          Triage Assessment    Airway WDL WDL        Respiratory WDL    Respiratory WDL WDL        Skin Circulation/Temperature WDL    Skin Circulation/Temperature WDL X        Cardiac WDL    Cardiac WDL WDL        Peripheral/Neurovascular WDL    Peripheral Neurovascular WDL WDL        Cognitive/Neuro/Behavioral WDL    Cognitive/Neuro/Behavioral WDL WDL

## 2024-12-09 NOTE — ED TRIAGE NOTES
[facility]  ED Arrival Note      Means of Arrival: EMS  Comes from: Home    Story: Pt brought in by EMS from home for left foot. Pt was seen in [place] yesterday diagnosed with broken foot and pt noted increased swelling today, increased pain         EMS/PD Interventions: N/A  EMS Medications: N/A    Meets Stroke Criteria? No  Meets Trauma Criteria? No  Shock Index (HR/SBP): N/A      Directed to: Triage Lobby  Belongings: Remain with patient

## 2024-12-09 NOTE — ED PROVIDER NOTES
Emergency Department Note      History of Present Illness     Chief Complaint   Foot Pain    HPI   Aman Broussard is a 54 year old male who presents to the emergency department for evaluation of worsening foot pain.  Yesterday, he fell off a ladder and was seen at [facility] emergency department.  He was diagnosed with multiple foot fractures and placed in a splint.  He was told to follow-up with podiatrist later this week.  He has an appointment scheduled for Thursday.  Pain overnight was quite bothersome with throbbing pain.  He wants to get surgery today and does not understand why it was not done yesterday.  He has not yet picked up his prescription pain medication but took Tylenol at home.  He received ibuprofen here in our lobby which helped. He reports some tingling but denies numbness.     Independent Historian   Family as detailed above.    Review of External Notes   I reviewed the [facility] emergency medicine note from yesterday.  I also reviewed his x-ray and CT scan of his foot.  He has 1st through 4th metatarsal fractures.  Head and C-spine were negative.  Pelvis and chest x-ray negative, ankle negative.    Past Medical History     Medical History and Problem List   Past Medical History:   Diagnosis Date    High cholesterol        Medications   cetirizine (ZYRTEC) 10 MG tablet  hydrocortisone (WESTCORT) 0.2 % cream  ketoconazole (NIZORAL) 2 % cream  METOPROLOL TARTRATE PO  psyllium 100 % POWD  WAL-MUCIL 58.6 % POWD      Surgical History   No past surgical history on file.    Physical Exam     Patient Vitals for the past 24 hrs:   BP Temp Temp src Pulse Resp SpO2   12/09/24 1143 (!) 144/76 98.8  F (37.1  C) Temporal 95 18 94 %     Physical Exam  Vital signs and nursing notes reviewed.    General:  Alert and oriented, no acute distress.   Skin: Skin is warm and dry. No rash. No diaphoresis.  HEENT:   Head: Normocephalic, atraumatic. Facial features symmetric.   Eyes: Conjunctiva pink, sclera white.  EOMs grossly intact.   Mouth and throat: Lips are moist with no lesions or edema  Neck: Normal range of motion.   CV:  Heart RRR. 2+ radial and tibialis posterior pulses bilaterally.   Pulm/Chest: Chest wall expansion symmetric with no increased effort of breathing. Lungs clear and equal to auscultation bilaterally.   M/S:   Left lower extremity: Swelling to foot and ankle, dorsal ecchymosis of the lateral foot with superficial blisters.  No drainage.    Able to move toes.  2-point sensation intact to all 5 toes.  Brisk capillary refill at all 5 toes. See photo below  Psych: Normal mood and affect. Behavior is normal.     Diagnostics     Lab Results   Labs Ordered and Resulted from Time of ED Arrival to Time of ED Departure - No data to display    Imaging   Foot XR, G/E 3 views, left   Final Result   IMPRESSION: I have no prior left foot radiographic exam for review.      Subacute-appearing mildly displaced fractures at the necks of the   distal left second and third metatarsals. Subacute appearing   comminuted fractures at the bases of the second, third and fourth   metatarsals extending to the proximal shafts of the second and fourth   metatarsals. Small fracture along the medial corner of the first   metatarsal base near the first TMT joint. No dislocation. No acute   appearing left foot fracture. No advanced joint space narrowing.   Diffuse bone demineralization. Left foot soft tissue swelling is   generalized but mainly proximal to mid and extending to the ankle.   Heel spur with distal Achilles insertional enthesophyte.      Direct comparison with previous left foot radiographic exam is   recommended.          BLANCA COLLAZO MD            SYSTEM ID:  [id number]          Independent Interpretation   I reviewed the foot x-ray and appreciate multiple metatarsal fractures    ED Course      Medications Administered   Medications   ibuprofen (ADVIL/MOTRIN) tablet 600 mg (600 mg Oral $Given 12/9/24 6536)      Procedures   Procedures     Splint Placement     Procedure: Splint Placement     Indication: Fracture    Consent: Verbal     Location: Left Leg    Preparation: Wounds were cleansed and dressed with a non-adherent bandage     Procedure detail:   Splint was applied by Tech/Nurse with my assistance  Splint type: Posterior short leg and stirrup   Splint materilal: Plaster  After placement I checked and adjusted the fit as needed to ensure proper positioning/fit   Sensation and circulation are intact after splint placement     Patient Status: The patient tolerated the procedure well: yes. There were no complications.    Discussion of Management/ED Course   ED Course as of 12/09/24 2258   Mon Dec 09, 2024   1533 I initially assessed the patient and obtained the above history and physical exam.     1602 I spoke with JAN Timmons with orthopedics, regarding patient's presentation, findings, and plan of care.  Recommends repeat XR, wound care for blisters (do not decompress), re-splint, and follow up later this week.        1735 I reassessed the patient and updated them on results and plan of care.      1822 Splinted foot     Additional Documentation  None    Medical Decision Making / Diagnosis     CMS Diagnoses: None    MIPS       None    [facility]   Aman GOLDMAN Bobo is a 54 year old male who presents to the emergency department with foot pain.  Was diagnosed with 4 metatarsal fractures yesterday in the ED.  See HPI.  Vitals reassuring.  On exam, he has expected ecchymosis and swelling with superficial blisters of the skin.  CMS is intact. No evidence to suggest compartment syndrome at, which would be very unlikely in the foot.  Repeat x-rays are similar to yesterday.  He is desiring definitive management with surgery today.  I discussed his case with orthopedics who recommended follow-up later this week to let swelling go down.  He will ultimately need surgery, but this is not emergent or needing to be done today in the  hospital.  Ortho recommended wound care to the blisters and splinting with posterior slab and stirrup.  We resplinted the patient to allow for his current level of swelling and heavily stressed the importance of elevation, which the patient was not doing last night.  He also did not  his pain medicine that he was prescribed yesterday.  He achieved pain control with ibuprofen given in the ED today and.  I discussed that he can use ibuprofen and the Percocet he was prescribed yesterday.  He has follow-up with [facility] podiatry, but also requests [facility] contact which will be provided.  Using reasonable clinical judgment, I feel he is safe for discharge home.  Return precautions reviewed with patient and his family in the room.    Disposition   The patient was discharged.     Diagnosis     ICD-10-CM    1. Closed fracture of multiple metatarsal bones  S92.309A         Discharge Medications   New Prescriptions    No medications on file     Cher Smith PA-C on 12/9/2024 at 11:04 PM       Cher Smith PA-C  12/09/24 2228

## 2024-12-10 NOTE — DISCHARGE INSTRUCTIONS
You can take 1000 mg of Tylenol every ~6 hours for pain.  Do not exceed 4000 mg in 24 hours. The prescription pain medicine that the doctor prescribed yesterday, has Tylenol in it. So do not take regular tylenol WITH your prescription medicine. You should not exceed 4000 mg of acetaminophen in 24 hours  Take 600 mg of ibuprofen every 6-8 hours for pain.  Do not exceed 2400 mg in a 24-hour period.  KEEP THE FOOT ELEVATED. THIS WILL HELP WITH SWELLING AND PAIN  Attend your appointment Thursday with [facility] or call [facility] tomorrow to schedule a specialist appointment. An orthopedic or food surgeon will have to schedule your surgery    Return to an ED if you develop numbness, fevers, uncontrolled pain, or further emergent concerns.   Discharge Instructions  Splint Care    You had a splint put on today to help protect your injury and help it heal.  Splints are used to treat things like strains, sprains, large cuts, and fractures (broken bones). Splints are temporary and are designed to allow for swelling.    Be sure your splint is not too tight!  If you splint is too tight, it may cause loss of blood supply.  Signs of your splint being too tight include: your arm or leg hurting a lot more; your fingers or toes getting numb, cold, pale or blue; or your child is crying, fussing or seeming restless.    Generally, every Emergency Department visit should have a follow-up clinic visit with either a primary or a specialty clinic/provider. Please follow-up as instructed by your emergency provider today.  Return to the Emergency Department right away if:  You have increased pain or pressure around the injury.  You have numbness, tingling, or cool, pale, or blue toes or fingers past the injury.  Your child is more fussy than normal, crying a lot, or restless.  Your splint becomes soft, breaks, or is wet.  Your splint begins to smell bad.  Your splint is cutting into your skin.    Home care:  Keep the injured  area above the level of your heart while laying or sitting down.  This will help decrease the swelling and the pain.  Keep the splint dry.  Do not put objects down or inside the splint.  If there is an elastic bandage (Ace  wrap) holding the splint on this may be loosened slightly to relieve pressure or pain.  If pain continues return to the Emergency Department right away.  Do not remove your splint by yourself unless told to by your provider.    Follow-up:  Sometimes the splint put on in the Emergency Department needs to be changed once the swelling has gone down and a more permanent cast needs to be placed.  This is usually done by a bone specialist provider (Orthopedist).  Follow the instructions given to you by your provider today.    If you were given a prescription for medicine here today, be sure to read all of the information (including the package insert) that comes with your prescription.  This will include important information about the medicine, its side effects, and any warnings that you need to know about.  The pharmacist who fills the prescription can provide more information and answer questions you may have about the medicine.  If you have questions or concerns that the pharmacist cannot address, please call or return to the Emergency Department.     Remember that you can always come back to the Emergency Department if you are not able to see your regular provider in the amount of time listed above, if you get any new symptoms, or if there is anything that worries you.